# Patient Record
Sex: MALE | Race: WHITE | Employment: FULL TIME | URBAN - METROPOLITAN AREA
[De-identification: names, ages, dates, MRNs, and addresses within clinical notes are randomized per-mention and may not be internally consistent; named-entity substitution may affect disease eponyms.]

---

## 2017-01-23 ENCOUNTER — OFFICE VISIT (OUTPATIENT)
Dept: PEDIATRICS | Facility: CLINIC | Age: 57
End: 2017-01-23
Payer: COMMERCIAL

## 2017-01-23 VITALS
TEMPERATURE: 97.9 F | WEIGHT: 148.06 LBS | OXYGEN SATURATION: 100 % | BODY MASS INDEX: 18.41 KG/M2 | SYSTOLIC BLOOD PRESSURE: 138 MMHG | HEART RATE: 80 BPM | RESPIRATION RATE: 18 BRPM | DIASTOLIC BLOOD PRESSURE: 86 MMHG | HEIGHT: 75 IN

## 2017-01-23 DIAGNOSIS — Z00.00 ROUTINE GENERAL MEDICAL EXAMINATION AT A HEALTH CARE FACILITY: Primary | ICD-10-CM

## 2017-01-23 DIAGNOSIS — H90.5 SENSORINEURAL HEARING LOSS, UNILATERAL: ICD-10-CM

## 2017-01-23 DIAGNOSIS — Z12.11 COLON CANCER SCREENING: ICD-10-CM

## 2017-01-23 DIAGNOSIS — Z11.59 NEED FOR HEPATITIS C SCREENING TEST: ICD-10-CM

## 2017-01-23 DIAGNOSIS — J32.9 PURULENT POSTNASAL DRAINAGE: ICD-10-CM

## 2017-01-23 PROCEDURE — 99396 PREV VISIT EST AGE 40-64: CPT | Performed by: INTERNAL MEDICINE

## 2017-01-23 RX ORDER — FLUTICASONE PROPIONATE 50 MCG
1-2 SPRAY, SUSPENSION (ML) NASAL DAILY
Qty: 1 BOTTLE | Refills: 11 | Status: SHIPPED | OUTPATIENT
Start: 2017-01-23 | End: 2018-01-26

## 2017-01-23 NOTE — PROGRESS NOTES
"  SUBJECTIVE:     CC: Danny Villarreal is an 56 year old male who presents for preventative health visit.     Pt is not fasting today.    Healthy Habits:    Do you get at least three servings of calcium containing foods daily (dairy, green leafy vegetables, etc.)? no, taking calcium and/or vitamin D supplement: no    Amount of exercise or daily activities, outside of work: 7 day(s) per week    Problems taking medications regularly No    Medication side effects: No    Have you had an eye exam in the past two years? yes    Do you see a dentist twice per year? no    Do you have sleep apnea, excessive snoring or daytime drowsiness?no    Concerns:  None     1. Recently with cold symptoms, including runny nose and cough. Still with a \"bit of a tickle\" in the back of his throat that is contributing to coughing. Has been going on for past 2 weeks or so. Does report that since these symptoms started, he has also noticed that he is urinating more and is waking up at night now usually 2 times per night to go to the bathroom. Does endorse weaker stream and not fully emptying his bladder.       Today's PHQ-2 Score:   PHQ-2 ( 1999 Pf[employer]) 1/23/2017 5/18/2015   Q1: Little interest or pleasure in doing things 0 0   Q2: Feeling down, depressed or hopeless 0 0   PHQ-2 Score 0 0       Abuse: Current or Past(Physical, Sexual or Emotional)- No  Do you feel safe in your environment - Yes    Social History   Substance Use Topics     Smoking status: Never Smoker      Smokeless tobacco: Never Used     Alcohol Use: Yes     The patient does not drink >3 drinks per day nor >7 drinks per week.    Last PSA: No results found for: PSA    No results for input(s): CHOL, HDL, LDL, TRIG, CHOLHDLRATIO, NHDL in the last 02159 hours.    Reviewed orders with patient. Reviewed health maintenance and updated orders accordingly - Yes    All Histories reviewed and updated in Epic.      ROS:  C: NEGATIVE for fever, chills, change in weight  I: NEGATIVE for " "worrisome rashes, moles or lesions  E: NEGATIVE for vision changes or irritation  ENT: NEGATIVE for ear, mouth and throat problems  R: see above  CV: NEGATIVE for chest pain, palpitations or peripheral edema  GI: NEGATIVE for nausea, abdominal pain, heartburn, or change in bowel habits   male: see above.   M: NEGATIVE for significant arthralgias or myalgia  N: NEGATIVE for weakness, dizziness or paresthesias  P: NEGATIVE for changes in mood or affect    Problem list, Medication list, Allergies, and Medical/Social/Surgical histories reviewed in [facility] and updated as appropriate.  OBJECTIVE:     /86 mmHg  Pulse 80  Temp(Src) 97.9  F (36.6  C) (Oral)  Resp 18  Ht 6' 3\" (1.905 m)  Wt 148 lb 1 oz (67.161 kg)  BMI 18.51 kg/m2  SpO2 100%  EXAM:  GENERAL: healthy, alert and no distress  EYES: Eyes grossly normal to inspection, PERRL and conjunctivae and sclerae normal  HENT: normal cephalic/atraumatic, ear canals and TM's normal, nose and mouth without ulcers or lesions, oropharynx clear, oral mucous membranes moist and small amount of purulent post nasal drainage noted.   NECK: no adenopathy, no asymmetry, masses, or scars and thyroid normal to palpation  RESP: lungs clear to auscultation - no rales, rhonchi or wheezes  CV: regular rate and rhythm, normal S1 S2, no S3 or S4, no murmur, click or rub, no peripheral edema and peripheral pulses strong  ABDOMEN: soft, nontender, no hepatosplenomegaly, no masses and bowel sounds normal  MS: no gross musculoskeletal defects noted, no edema  SKIN: no suspicious lesions or rashes  NEURO: Normal strength and tone, mentation intact and speech normal  PSYCH: mentation appears normal, affect normal/bright    ASSESSMENT/PLAN:     1. Routine general medical examination at a health care facility  Due for fasting labs. Reports that his Tdap and influenza are up to date. Counseling as below.   - GLUCOSE; Future  - Lipid panel reflex to direct LDL; Future    2. Sensorineural " "hearing loss, unilateral  Stable.     3. Purulent postnasal drainage  Likely source of patient's cough. Unclear why he is having more polyuria with this. Will treat with nasal steroid spray; discussed that if polyuria persists would be reasonable to initiate work-up for BPH or other etiologist.   - fluticasone (FLONASE) 50 MCG/ACT spray; Spray 1-2 sprays into both nostrils daily  Dispense: 1 Bottle; Refill: 11    4. Colon cancer screening  - Fecal colorectal cancer screen (FIT); Future    5. Need for hepatitis C screening test  - Hepatitis C Screen Reflex to HCV RNA Quant and Genotype; Future    COUNSELING:  Reviewed preventive health counseling, as reflected in patient instructions  Special attention given to:        Regular exercise       Healthy diet/nutrition       Consider Hep C screening for patients born between 1945 and 1965       Colon cancer screening       Prostate cancer screening    BP Screening:   Last 3 BP Readings:    BP Readings from Last 3 Encounters:   01/23/17 138/86   05/18/15 124/90       The following was recommended to the patient:  Re-screen BP within a year and recommended lifestyle modifications       reports that he has never smoked. He has never used smokeless tobacco.    Estimated body mass index is 18.51 kg/(m^2) as calculated from the following:    Height as of this encounter: 6' 3\" (1.905 m).    Weight as of this encounter: 148 lb 1 oz (67.161 kg).       Counseling Resources:  ATP IV Guidelines  Pooled Cohorts Equation Calculator  FRAX Risk Assessment  ICSI Preventive Guidelines  Dietary Guidelines for Americans, 2010  USDA's MyPlate  ASA Prophylaxis  Lung CA Screening    Cynthia Gonzalez MD  [facility] [place]  "

## 2017-01-23 NOTE — NURSING NOTE
"Chief Complaint   Patient presents with     Establish Care     Physical       Initial /86 mmHg  Pulse 80  Temp(Src) 97.9  F (36.6  C) (Oral)  Resp 18  Ht 6' 3\" (1.905 m)  Wt 148 lb 1 oz (67.161 kg)  BMI 18.51 kg/m2  SpO2 100% Estimated body mass index is 18.51 kg/(m^2) as calculated from the following:    Height as of this encounter: 6' 3\" (1.905 m).    Weight as of this encounter: 148 lb 1 oz (67.161 kg).  BP completed using cuff size: jonathan Haynes MA 1/23/2017 8:14 AM      "

## 2017-01-23 NOTE — PATIENT INSTRUCTIONS
"Come back for a \"lab only\" appointment. Make sure you are fasting! We will check blood sugar (diabetes screen), cholesterol, and Hepatitis C. We will also give you stool cards to screen annually for colon cancer.    We will need to follow your blood pressure closely, at least once per year.    For persistent cough and nasal congestion, this is likely due at least in part to post nasal drainage. Let's have you use Flonase nasal spray 1-2 sprays 1-2 times per day in each nostril until feeling better. Come back if no improvement after 2 weeks.     Preventive Health Recommendations  Male Ages 50 - 64    Yearly exam:             See your health care provider every year in order to  o   Review health changes.   o   Discuss preventive care.    o   Review your medicines if your doctor has prescribed any.     Have a cholesterol test every 5 years, or more frequently if you are at risk for high cholesterol/heart disease.     Have a diabetes test (fasting glucose) every three years. If you are at risk for diabetes, you should have this test more often.     Have a colonoscopy at age 50, or have a yearly FIT test (stool test). These exams will check for colon cancer.      Talk with your health care provider about whether or not a prostate cancer screening test (PSA) is right for you.    You should be tested each year for STDs (sexually transmitted diseases), if you re at risk.     Shots: Get a flu shot each year. Get a tetanus shot every 10 years.     Nutrition:    Eat at least 5 servings of fruits and vegetables daily.     Eat whole-grain bread, whole-wheat pasta and brown rice instead of white grains and rice.     Talk to your provider about Calcium and Vitamin D.     Lifestyle    Exercise for at least 150 minutes a week (30 minutes a day, 5 days a week). This will help you control your weight and prevent disease.     Limit alcohol to one drink per day.     No smoking.     Wear sunscreen to prevent skin cancer.     See your " dentist every six months for an exam and cleaning.     See your eye doctor every 1 to 2 years.

## 2017-01-23 NOTE — MR AVS SNAPSHOT
"              After Visit Summary   1/23/2017    Danny Villarreal    MRN: [id number]           Patient Information     Date Of Birth          1960        Visit Information        Provider Department      1/23/2017 8:10 AM Cynthia Castillo MD [facility]        Today's Diagnoses     Routine general medical examination at a health care facility    -  1     Sensorineural hearing loss, unilateral         Purulent postnasal drainage         Colon cancer screening         Need for hepatitis C screening test           Care Instructions    Come back for a \"lab only\" appointment. Make sure you are fasting! We will check blood sugar (diabetes screen), cholesterol, and Hepatitis C. We will also give you stool cards to screen annually for colon cancer.    We will need to follow your blood pressure closely, at least once per year.    For persistent cough and nasal congestion, this is likely due at least in part to post nasal drainage. Let's have you use Flonase nasal spray 1-2 sprays 1-2 times per day in each nostril until feeling better. Come back if no improvement after 2 weeks.     Preventive Health Recommendations  Male Ages 50 - 64    Yearly exam:             See your health care provider every year in order to  o   Review health changes.   o   Discuss preventive care.    o   Review your medicines if your doctor has prescribed any.     Have a cholesterol test every 5 years, or more frequently if you are at risk for high cholesterol/heart disease.     Have a diabetes test (fasting glucose) every three years. If you are at risk for diabetes, you should have this test more often.     Have a colonoscopy at age 50, or have a yearly FIT test (stool test). These exams will check for colon cancer.      Talk with your health care provider about whether or not a prostate cancer screening test (PSA) is right for you.    You should be tested each year for STDs (sexually transmitted diseases), if you re at risk.     Shots: " Get a flu shot each year. Get a tetanus shot every 10 years.     Nutrition:    Eat at least 5 servings of fruits and vegetables daily.     Eat whole-grain bread, whole-wheat pasta and brown rice instead of white grains and rice.     Talk to your provider about Calcium and Vitamin D.     Lifestyle    Exercise for at least 150 minutes a week (30 minutes a day, 5 days a week). This will help you control your weight and prevent disease.     Limit alcohol to one drink per day.     No smoking.     Wear sunscreen to prevent skin cancer.     See your dentist every six months for an exam and cleaning.     See your eye doctor every 1 to 2 years.            Follow-ups after your visit        Future tests that were ordered for you today     Open Future Orders        Priority Expected Expires Ordered    GLUCOSE Routine  4/23/2017 1/23/2017    Fecal colorectal cancer screen (FIT) Routine 2/13/2017 4/23/2017 1/23/2017    Lipid panel reflex to direct LDL Routine  4/23/2017 1/23/2017    Hepatitis C Screen Reflex to HCV RNA Quant and Genotype Routine  4/23/2017 1/23/2017            Who to contact     If you have questions or need follow up information about today's clinic visit or your schedule please contact [facility] directly at [phone].  Normal or non-critical lab and imaging results will be communicated to you by Impermiumhart, letter or phone within 4 business days after the clinic has received the results. If you do not hear from us within 7 days, please contact the clinic through Impermiumhart or phone. If you have a critical or abnormal lab result, we will notify you by phone as soon as possible.  Submit refill requests through Cloudbot or call your pharmacy and they will forward the refill request to us. Please allow 3 business days for your refill to be completed.          Additional Information About Your Visit        Cloudbot Information     Cloudbot lets you send messages to your doctor, view your test results, renew  "your prescriptions, schedule appointments and more. To sign up, go to [email or web address] . Click on \"Log in\" on the left side of the screen, which will take you to the Welcome page. Then click on \"Sign up Now\" on the right side of the page.     You will be asked to enter the access code listed below, as well as some personal information. Please follow the directions to create your username and password.     Your access code is: [email or web address]  Expires: 2017  8:40 AM     Your access code will  in 90 days. If you need help or a new code, please call your [place] clinic or [phone].        Care EveryWhere ID     This is your Care EveryWhere ID. This could be used by other organizations to access your [place] medical records  [id number]        Your Vitals Were     Pulse Temperature Respirations Height BMI (Body Mass Index) Pulse Oximetry    80 97.9  F (36.6  C) (Oral) 18 6' 3\" (1.905 m) 18.51 kg/m2 100%       Blood Pressure from Last 3 Encounters:   17 138/86   05/18/15 124/90    Weight from Last 3 Encounters:   17 148 lb 1 oz (67.161 kg)   05/18/15 165 lb 6.4 oz (75.025 kg)                 Today's Medication Changes          These changes are accurate as of: 17  8:40 AM.  If you have any questions, ask your nurse or doctor.               Start taking these medicines.        Dose/Directions    fluticasone 50 MCG/ACT spray   Commonly known as:  FLONASE   Used for:  Purulent postnasal drainage   Started by:  Cynthia Castillo MD        Dose:  1-2 spray   Spray 1-2 sprays into both nostrils daily   Quantity:  1 Bottle   Refills:  11            Where to get your medicines      These medications were sent to [facility] - [address]   [address] [place] [address] [id number]     Phone:  [phone]    - fluticasone 50 MCG/ACT spray             Primary Care Provider Office Phone # Fax #    Cynthia Castillo [phone] "       [facility] [place] [address] [place] [address]        Thank you!     [facility]  [facility]. Our goal is always to provide you with excellent care. Hearing back from our patients is one way we can continue to improve our services. Please take a few minutes to complete the written survey that you may receive in the mail after your visit with us. Thank you!             Your Updated Medication List - Protect others around you: Learn how to safely use, store and throw away your medicines at www.disposemymeds.org.          This list is accurate as of: 1/23/17  8:40 AM.  Always use your most recent med list.                   Brand Name Dispense Instructions for use    fluticasone 50 MCG/ACT spray    FLONASE    1 Bottle    Spray 1-2 sprays into both nostrils daily

## 2017-01-30 DIAGNOSIS — Z11.59 NEED FOR HEPATITIS C SCREENING TEST: ICD-10-CM

## 2017-01-30 DIAGNOSIS — Z00.00 ROUTINE GENERAL MEDICAL EXAMINATION AT A HEALTH CARE FACILITY: ICD-10-CM

## 2017-01-30 LAB
CHOLEST SERPL-MCNC: 156 MG/DL
GLUCOSE SERPL-MCNC: 104 MG/DL (ref 70–99)
HDLC SERPL-MCNC: 59 MG/DL
LDLC SERPL CALC-MCNC: 84 MG/DL
NONHDLC SERPL-MCNC: 97 MG/DL
TRIGL SERPL-MCNC: 63 MG/DL

## 2017-01-30 PROCEDURE — 36415 COLL VENOUS BLD VENIPUNCTURE: CPT | Performed by: INTERNAL MEDICINE

## 2017-01-30 PROCEDURE — 82947 ASSAY GLUCOSE BLOOD QUANT: CPT | Performed by: INTERNAL MEDICINE

## 2017-01-30 PROCEDURE — 86803 HEPATITIS C AB TEST: CPT | Performed by: INTERNAL MEDICINE

## 2017-01-30 PROCEDURE — 80061 LIPID PANEL: CPT | Performed by: INTERNAL MEDICINE

## 2017-01-31 LAB — HCV AB SERPL QL IA: NORMAL

## 2017-02-05 PROCEDURE — 82274 ASSAY TEST FOR BLOOD FECAL: CPT | Performed by: INTERNAL MEDICINE

## 2017-02-07 DIAGNOSIS — Z12.11 COLON CANCER SCREENING: ICD-10-CM

## 2017-02-07 LAB — HEMOCCULT STL QL IA: NEGATIVE

## 2018-01-26 DIAGNOSIS — J32.9 PURULENT POSTNASAL DRAINAGE: ICD-10-CM

## 2018-01-26 NOTE — TELEPHONE ENCOUNTER
"Requested Prescriptions   Pending Prescriptions Disp Refills     fluticasone (FLONASE) 50 MCG/ACT spray [Pharmacy Med Name: FLUTICASONE 50MCG NASAL SPRAY]    Last Written Prescription Date:  1/23/2017  Last Fill Quantity: 1,  # refills: 11   Last Office Visit with [place] provider:  1/23/2017   Future Office Visit:      16 g 11     Sig: SPRAY 1-2 SPRAYS INTO BOTH NOSTRILS DAILY    Inhaled Steroids Protocol Failed    1/26/2018  8:09 AM       Failed - Recent or future visit with authorizing provider's specialty    Patient had office visit in the last year or has a visit in the next 30 days with authorizing provider.  See \"Patient Info\" tab in inbasket, or \"Choose Columns\" in Meds & Orders section of the refill encounter.            Passed - Patient is age 12 or older          "

## 2018-01-31 RX ORDER — FLUTICASONE PROPIONATE 50 MCG
SPRAY, SUSPENSION (ML) NASAL
Qty: 16 G | Refills: 0 | Status: SHIPPED | OUTPATIENT
Start: 2018-01-31

## 2018-01-31 NOTE — TELEPHONE ENCOUNTER
Medication is being filled for 1 time refill only due to:  Patient needs to be seen because it has been more than one year since last visit.   Note sent to pharmacy.  Xiomara Jones RN  Triage Nurse

## 2021-03-31 ENCOUNTER — OFFICE VISIT (OUTPATIENT)
Dept: URGENT CARE | Facility: URGENT CARE | Age: 61
End: 2021-03-31
Payer: COMMERCIAL

## 2021-03-31 VITALS
SYSTOLIC BLOOD PRESSURE: 155 MMHG | HEART RATE: 78 BPM | RESPIRATION RATE: 20 BRPM | TEMPERATURE: 98.2 F | OXYGEN SATURATION: 98 % | DIASTOLIC BLOOD PRESSURE: 80 MMHG

## 2021-03-31 DIAGNOSIS — L53.9 ERYTHEMA: Primary | ICD-10-CM

## 2021-03-31 LAB
ERYTHROCYTE [SEDIMENTATION RATE] IN BLOOD BY WESTERGREN METHOD: 9 MM/H (ref 0–20)
URATE SERPL-MCNC: 3.8 MG/DL (ref 3.5–7.2)
WBC # BLD AUTO: 7.9 10E9/L (ref 4–11)

## 2021-03-31 PROCEDURE — 36415 COLL VENOUS BLD VENIPUNCTURE: CPT | Performed by: PHYSICIAN ASSISTANT

## 2021-03-31 PROCEDURE — 85652 RBC SED RATE AUTOMATED: CPT | Performed by: PHYSICIAN ASSISTANT

## 2021-03-31 PROCEDURE — 99213 OFFICE O/P EST LOW 20 MIN: CPT | Performed by: PHYSICIAN ASSISTANT

## 2021-03-31 PROCEDURE — 84550 ASSAY OF BLOOD/URIC ACID: CPT | Performed by: PHYSICIAN ASSISTANT

## 2021-03-31 PROCEDURE — 85048 AUTOMATED LEUKOCYTE COUNT: CPT | Performed by: PHYSICIAN ASSISTANT

## 2021-03-31 RX ORDER — CEPHALEXIN 500 MG/1
500 CAPSULE ORAL 4 TIMES DAILY
Qty: 28 CAPSULE | Refills: 0 | Status: SHIPPED | OUTPATIENT
Start: 2021-03-31 | End: 2021-04-07

## 2021-03-31 NOTE — PROGRESS NOTES
SUBJECTIVE:  Danny Villarreal is a 60 year old male who presents to the clinic today for a rash.  Onset of rash was 1 day(s) ago.   Rash is gradual onset.  Location of the rash: finger.  Quality/symptoms of rash: painful, dry, red and swollen   Symptoms are mild and rash seems to be stable.  Previous history of a similar rash? Yes: treated as cellulitis  Recent exposure history: none known    Associated symptoms include: nothing.    History reviewed. No pertinent past medical history.  Current Outpatient Medications   Medication Sig Dispense Refill     fluticasone (FLONASE) 50 MCG/ACT spray SPRAY 1-2 SPRAYS INTO BOTH NOSTRILS DAILY 16 g 0     Social History     Tobacco Use     Smoking status: Never Smoker     Smokeless tobacco: Never Used   Substance Use Topics     Alcohol use: Yes     Alcohol/week: 0.0 standard drinks       ROS:  10 point ROS negative except as listed above      EXAM:   BP (!) 155/80   Pulse 78   Temp 98.2  F (36.8  C) (Tympanic)   Resp 20   SpO2 98%   GENERAL: alert, no acute distress.  SKIN: rigth thumb with mild erythema, tenderness and swelling.  ROM and sensation intact  RESP: lungs clear to auscultation - no rales, rhonchi or wheezes  CV: regular rates and rhythm, normal S1 S2, no murmur noted  NEURO: Normal strength and tone, sensory exam grossly normal,  normal speech and mentation      LABS PENDING      ASSESSMENT:  (L53.9) Erythema  (primary encounter diagnosis)  Plan: cephALEXin (KEFLEX) 500 MG capsule, WBC count,         Uric acid, ESR: Erythrocyte sedimentation rate      Patient Instructions     Patient Education     Cellulitis  Cellulitis is an infection of the deep layers of skin. A break in the skin, such as a cut or scratch, can let bacteria under the skin. If the bacteria get to deep layers of the skin, it can be serious. If not treated, cellulitis can get into the bloodstream and lymph nodes. The infection can then spread throughout the body. This causes serious illness.    Cellulitis causes the affected skin to become red, swollen, warm, and sore. The reddened areas have a visible border. An open sore may leak fluid (pus). You may have a fever, chills, and pain.   Cellulitis is treated with antibiotics taken for 7 to 10 days. An open sore may be cleaned and covered with cool wet gauze. Symptoms should get better 1 to 2 days after treatment is started. Make sure to take all the antibiotics for the full number of days until they are gone. Keep taking the medicine even if your symptoms go away.   Home care  Follow these tips:    Limit the use of the part of your body with cellulitis.     If the infection is on your leg, keep your leg raised while sitting. This helps reduce swelling.    Take all of the antibiotic medicine exactly as directed until it is gone. Don't miss any doses, especially during the first 7 days. Don t stop taking the medicine when your symptoms get better.    Keep the affected area clean and dry.    Wash your hands with soap and clean, running water before and after touching your skin. Anyone else who touches your skin should also wash his or her hands. Don't share towels.  Follow-up care  Follow up with your healthcare provider, or as advised. If your infection doesn't go away on the first antibiotic, your healthcare provider will prescribe a different one.   When to seek medical advice  Call your healthcare provider right away if any of these occur:    Red areas that spread    Swelling or pain that gets worse    Fluid leaking from the skin (pus)    Fever higher of 100.4  F (38.0  C) or higher after 2 days on antibiotics  [email or web address] last reviewed this educational content on 8/1/2019 2000-2020 [employer]. All rights reserved. This information is not intended as a substitute for professional medical care. Always follow your healthcare professional's instructions.           Patient Education     Gout    Gout is an inflammation of a joint caused by an  inflammatory response to gout crystals in the joint fluid. This occurs when there is excess uric acid. Uric acid is a normal waste product in the body. It builds up in the body when the kidneys can't filter enough of it from the blood. This may occur with age. It is also associated with kidney disease. Gout occurs more often in people with obesity, diabetes, high blood pressure, or high levels of fats in the blood. It may run in families. Gout tends to come and go. A flare up of gout is called an attack. Drinking alcohol or eating certain foods (such as shellfish or foods with additives such as high-fructose corn syrup) may increase uric acid levels in the blood and cause a gout attack.   During a gout attack, the affected joint may become hot, red, swollen, and painful. If you have had one attack of gout, you are likely to have another. An attack of gout can be treated with medicine. If these attacks become frequent, a daily medicine may be prescribed to help the kidneys remove uric acid from the body.   Home care  During a gout attack:    Contact your healthcare provider for advice and therapy options at the early stages of a gout flare. Treating the flare right away can prevent it from getting worse.    Rest painful joints. If gout affects the joints of your foot or leg, you may want to use crutches for the first few days to keep from bearing weight on the affected joint.    When sitting or lying down, raise the painful joint to a level higher than your heart.    Apply an ice pack (ice cubes in a plastic bag wrapped in a thin towel) over the injured area for 20 minutes every 1 to 2 hours the first day for pain relief. Continue this 3 to 4 times a day for swelling and pain.    Avoid alcohol and foods listed below (see Preventing attacks) during a gout attack. Drink extra fluid to help flush the uric acid through your kidneys.    If you were prescribed a medicine to treat gout, take it as your healthcare provider  has instructed. Don't skip doses.    Take anti-inflammatory medicine as directed by your healthcare provider.    If pain medicines have been prescribed, take them exactly as directed.    Preventing attacks    Limit or stop  alcohol use. Excess alcohol intake can cause a gout attack.    Limit these foods and beverages:  ? Organ meats, such as kidneys and liver  ? Certain seafoods (anchovies, sardines, shrimp, scallops, herring, mackerel)  ? Wild game, meat extracts and meat gravies  ? Foods and beverages sweetened with high-fructose corn syrup, such as sodas    Eat a healthy diet including low-fat and nonfat dairy, whole grains, and vegetables.    If you are overweight, talk to your healthcare provider about a weight reduction plan. Avoid fasting or extreme low calorie diets (less than 900 calories per day). This will increase uric acid levels in the body.    If you have diabetes or high blood pressure, work with your doctor to manage these conditions.    Protect the joint from injury. Wear good fitting socks and shoes. Injury can trigger a gout attack.    Follow-up care  Follow up with your healthcare provider, or as advised.   When to seek medical advice  Call your healthcare provider if you have any of the following:    Fever over 100.4 F (38. C) with worsening joint pain    Increasing redness around the joint    Pain developing in another joint    Repeated vomiting, abdominal pain, or blood in the vomit or stool (black or red color)  [employer] last reviewed this educational content on 6/1/2019 2000-2020 [employer]. All rights reserved. This information is not intended as a substitute for professional medical care. Always follow your healthcare professional's instructions.

## 2021-03-31 NOTE — PATIENT INSTRUCTIONS
Patient Education     Cellulitis  Cellulitis is an infection of the deep layers of skin. A break in the skin, such as a cut or scratch, can let bacteria under the skin. If the bacteria get to deep layers of the skin, it can be serious. If not treated, cellulitis can get into the bloodstream and lymph nodes. The infection can then spread throughout the body. This causes serious illness.   Cellulitis causes the affected skin to become red, swollen, warm, and sore. The reddened areas have a visible border. An open sore may leak fluid (pus). You may have a fever, chills, and pain.   Cellulitis is treated with antibiotics taken for 7 to 10 days. An open sore may be cleaned and covered with cool wet gauze. Symptoms should get better 1 to 2 days after treatment is started. Make sure to take all the antibiotics for the full number of days until they are gone. Keep taking the medicine even if your symptoms go away.   Home care  Follow these tips:    Limit the use of the part of your body with cellulitis.     If the infection is on your leg, keep your leg raised while sitting. This helps reduce swelling.    Take all of the antibiotic medicine exactly as directed until it is gone. Don't miss any doses, especially during the first 7 days. Don t stop taking the medicine when your symptoms get better.    Keep the affected area clean and dry.    Wash your hands with soap and clean, running water before and after touching your skin. Anyone else who touches your skin should also wash his or her hands. Don't share towels.  Follow-up care  Follow up with your healthcare provider, or as advised. If your infection doesn't go away on the first antibiotic, your healthcare provider will prescribe a different one.   When to seek medical advice  Call your healthcare provider right away if any of these occur:    Red areas that spread    Swelling or pain that gets worse    Fluid leaking from the skin (pus)    Fever higher of 100.4  F (38.0   C) or higher after 2 days on antibiotics  [employer] last reviewed this educational content on 8/1/2019 2000-2020 [employer]. All rights reserved. This information is not intended as a substitute for professional medical care. Always follow your healthcare professional's instructions.           Patient Education     Gout    Gout is an inflammation of a joint caused by an inflammatory response to gout crystals in the joint fluid. This occurs when there is excess uric acid. Uric acid is a normal waste product in the body. It builds up in the body when the kidneys can't filter enough of it from the blood. This may occur with age. It is also associated with kidney disease. Gout occurs more often in people with obesity, diabetes, high blood pressure, or high levels of fats in the blood. It may run in families. Gout tends to come and go. A flare up of gout is called an attack. Drinking alcohol or eating certain foods (such as shellfish or foods with additives such as high-fructose corn syrup) may increase uric acid levels in the blood and cause a gout attack.   During a gout attack, the affected joint may become hot, red, swollen, and painful. If you have had one attack of gout, you are likely to have another. An attack of gout can be treated with medicine. If these attacks become frequent, a daily medicine may be prescribed to help the kidneys remove uric acid from the body.   Home care  During a gout attack:    Contact your healthcare provider for advice and therapy options at the early stages of a gout flare. Treating the flare right away can prevent it from getting worse.    Rest painful joints. If gout affects the joints of your foot or leg, you may want to use crutches for the first few days to keep from bearing weight on the affected joint.    When sitting or lying down, raise the painful joint to a level higher than your heart.    Apply an ice pack (ice cubes in a plastic bag wrapped in a thin  towel) over the injured area for 20 minutes every 1 to 2 hours the first day for pain relief. Continue this 3 to 4 times a day for swelling and pain.    Avoid alcohol and foods listed below (see Preventing attacks) during a gout attack. Drink extra fluid to help flush the uric acid through your kidneys.    If you were prescribed a medicine to treat gout, take it as your healthcare provider has instructed. Don't skip doses.    Take anti-inflammatory medicine as directed by your healthcare provider.    If pain medicines have been prescribed, take them exactly as directed.    Preventing attacks    Limit or stop  alcohol use. Excess alcohol intake can cause a gout attack.    Limit these foods and beverages:  ? Organ meats, such as kidneys and liver  ? Certain seafoods (anchovies, sardines, shrimp, scallops, herring, mackerel)  ? Wild game, meat extracts and meat gravies  ? Foods and beverages sweetened with high-fructose corn syrup, such as sodas    Eat a healthy diet including low-fat and nonfat dairy, whole grains, and vegetables.    If you are overweight, talk to your healthcare provider about a weight reduction plan. Avoid fasting or extreme low calorie diets (less than 900 calories per day). This will increase uric acid levels in the body.    If you have diabetes or high blood pressure, work with your doctor to manage these conditions.    Protect the joint from injury. Wear good fitting socks and shoes. Injury can trigger a gout attack.    Follow-up care  Follow up with your healthcare provider, or as advised.   When to seek medical advice  Call your healthcare provider if you have any of the following:    Fever over 100.4 F (38. C) with worsening joint pain    Increasing redness around the joint    Pain developing in another joint    Repeated vomiting, abdominal pain, or blood in the vomit or stool (black or red color)  [employer] last reviewed this educational content on 6/1/2019 2000-2020 [employer] All rights reserved. This information is not intended as a substitute for professional medical care. Always follow your healthcare professional's instructions.

## 2021-04-04 ENCOUNTER — HEALTH MAINTENANCE LETTER (OUTPATIENT)
Age: 61
End: 2021-04-04

## 2021-09-19 ENCOUNTER — HEALTH MAINTENANCE LETTER (OUTPATIENT)
Age: 61
End: 2021-09-19

## 2022-04-30 ENCOUNTER — HEALTH MAINTENANCE LETTER (OUTPATIENT)
Age: 62
End: 2022-04-30

## 2022-11-20 ENCOUNTER — HEALTH MAINTENANCE LETTER (OUTPATIENT)
Age: 62
End: 2022-11-20

## 2023-07-08 ENCOUNTER — HEALTH MAINTENANCE LETTER (OUTPATIENT)
Age: 63
End: 2023-07-08